# Patient Record
Sex: FEMALE | URBAN - METROPOLITAN AREA
[De-identification: names, ages, dates, MRNs, and addresses within clinical notes are randomized per-mention and may not be internally consistent; named-entity substitution may affect disease eponyms.]

---

## 2022-05-11 ENCOUNTER — OFFICE VISIT (OUTPATIENT)
Dept: URBAN - METROPOLITAN AREA CLINIC 10 | Facility: CLINIC | Age: 57
End: 2022-05-11
Payer: COMMERCIAL

## 2022-05-11 DIAGNOSIS — H52.13 MYOPIA, BILATERAL: ICD-10-CM

## 2022-05-11 DIAGNOSIS — H04.123 TEAR FILM INSUFFICIENCY OF BILATERAL LACRIMAL GLANDS: Primary | ICD-10-CM

## 2022-05-11 PROCEDURE — 99204 OFFICE O/P NEW MOD 45 MIN: CPT | Performed by: OPTOMETRIST

## 2022-05-11 PROCEDURE — 92134 CPTRZ OPH DX IMG PST SGM RTA: CPT | Performed by: OPTOMETRIST

## 2022-05-11 ASSESSMENT — KERATOMETRY
OS: 43.50
OD: 43.50

## 2022-05-11 ASSESSMENT — VISUAL ACUITY
OS: 20/20
OD: 20/20

## 2022-05-11 ASSESSMENT — INTRAOCULAR PRESSURE
OS: 18
OD: 16

## 2022-05-11 NOTE — IMPRESSION/PLAN
Impression: Tear film insufficiency of bilateral lacrimal glands: H04.123. Plan: Recommend Omega 3s, artificial tears QID PRN, warm compresses, and lid scrubs. RTC for normal recall, sooner with issues.